# Patient Record
Sex: MALE | Race: AMERICAN INDIAN OR ALASKA NATIVE | NOT HISPANIC OR LATINO | Employment: FULL TIME | URBAN - METROPOLITAN AREA
[De-identification: names, ages, dates, MRNs, and addresses within clinical notes are randomized per-mention and may not be internally consistent; named-entity substitution may affect disease eponyms.]

---

## 2017-02-10 ENCOUNTER — APPOINTMENT (EMERGENCY)
Dept: RADIOLOGY | Facility: HOSPITAL | Age: 52
End: 2017-02-10
Payer: OTHER MISCELLANEOUS

## 2017-02-10 ENCOUNTER — HOSPITAL ENCOUNTER (EMERGENCY)
Facility: HOSPITAL | Age: 52
Discharge: HOME | End: 2017-02-10
Attending: EMERGENCY MEDICINE | Admitting: EMERGENCY MEDICINE
Payer: OTHER MISCELLANEOUS

## 2017-02-10 VITALS
HEART RATE: 76 BPM | OXYGEN SATURATION: 96 % | SYSTOLIC BLOOD PRESSURE: 181 MMHG | RESPIRATION RATE: 18 BRPM | WEIGHT: 150 LBS | HEIGHT: 65 IN | BODY MASS INDEX: 24.99 KG/M2 | DIASTOLIC BLOOD PRESSURE: 87 MMHG | TEMPERATURE: 98.6 F

## 2017-02-10 DIAGNOSIS — L08.9 SKIN INFECTION: Primary | ICD-10-CM

## 2017-02-10 DIAGNOSIS — S46.912A STRAIN OF LEFT SHOULDER, INITIAL ENCOUNTER: ICD-10-CM

## 2017-02-10 PROCEDURE — 90715 TDAP VACCINE 7 YRS/> IM: CPT | Performed by: EMERGENCY MEDICINE

## 2017-02-10 PROCEDURE — 73030 X-RAY EXAM OF SHOULDER: CPT

## 2017-02-10 PROCEDURE — 90471 IMMUNIZATION ADMIN: CPT

## 2017-02-10 PROCEDURE — 99283 EMERGENCY DEPT VISIT LOW MDM: CPT

## 2017-02-10 RX ORDER — DOXYCYCLINE HYCLATE 100 MG/1
100 CAPSULE ORAL 2 TIMES DAILY
Qty: 20 CAPSULE | Refills: 0 | Status: SHIPPED | OUTPATIENT
Start: 2017-02-10 | End: 2017-02-20

## 2017-02-10 RX ORDER — NAPROXEN 375 MG/1
375 TABLET ORAL 2 TIMES DAILY WITH MEALS
Qty: 14 TABLET | Refills: 0 | Status: SHIPPED | OUTPATIENT
Start: 2017-02-10 | End: 2017-02-17

## 2017-02-10 RX ORDER — DOXYCYCLINE HYCLATE 100 MG/1
100 CAPSULE ORAL ONCE
Status: COMPLETED | OUTPATIENT
Start: 2017-02-10 | End: 2017-02-10

## 2017-02-10 RX ADMIN — TETANUS TOXOID, REDUCED DIPHTHERIA TOXOID AND ACELLULAR PERTUSSIS VACCINE, ADSORBED 0.5 ML: 5; 2.5; 8; 8; 2.5 SUSPENSION INTRAMUSCULAR at 16:47

## 2017-02-10 RX ADMIN — DOXYCYCLINE HYCLATE 100 MG: 100 CAPSULE, GELATIN COATED ORAL at 16:46

## 2022-03-22 ENCOUNTER — TELEPHONE (OUTPATIENT)
Dept: GASTROENTEROLOGY | Facility: CLINIC | Age: 57
End: 2022-03-22

## 2022-03-22 NOTE — TELEPHONE ENCOUNTER
Patient called left message for an appointment  Returned call, he wants to see who he saw years ago, told him we will look in old system and call him back

## 2022-03-23 NOTE — TELEPHONE ENCOUNTER
Called patient to schedule appointment   He was at work and will back tomorrow to schedule  He saw Toby Powell in 2018

## 2022-05-09 ENCOUNTER — OFFICE VISIT (OUTPATIENT)
Dept: GASTROENTEROLOGY | Facility: CLINIC | Age: 57
End: 2022-05-09
Payer: COMMERCIAL

## 2022-05-09 VITALS
SYSTOLIC BLOOD PRESSURE: 126 MMHG | BODY MASS INDEX: 29.32 KG/M2 | HEIGHT: 65 IN | HEART RATE: 83 BPM | WEIGHT: 176 LBS | OXYGEN SATURATION: 96 % | DIASTOLIC BLOOD PRESSURE: 60 MMHG

## 2022-05-09 DIAGNOSIS — R35.0 FREQUENT URINATION: ICD-10-CM

## 2022-05-09 DIAGNOSIS — K21.9 GASTROESOPHAGEAL REFLUX DISEASE WITHOUT ESOPHAGITIS: Primary | ICD-10-CM

## 2022-05-09 PROCEDURE — 99203 OFFICE O/P NEW LOW 30 MIN: CPT | Performed by: PHYSICIAN ASSISTANT

## 2022-05-09 RX ORDER — TRIAMCINOLONE ACETONIDE 1 MG/G
CREAM TOPICAL
COMMUNITY
Start: 2022-03-29

## 2022-05-09 RX ORDER — PANTOPRAZOLE SODIUM 40 MG/1
TABLET, DELAYED RELEASE ORAL
COMMUNITY
Start: 2022-02-06 | End: 2022-05-09 | Stop reason: SDUPTHER

## 2022-05-09 RX ORDER — AMLODIPINE BESYLATE 10 MG/1
TABLET ORAL
COMMUNITY
Start: 2022-02-28

## 2022-05-09 RX ORDER — DOXEPIN HYDROCHLORIDE 100 MG/1
200 CAPSULE ORAL EVERY EVENING
COMMUNITY
Start: 2022-04-29

## 2022-05-09 RX ORDER — TRAZODONE HYDROCHLORIDE 100 MG/1
TABLET ORAL
COMMUNITY
Start: 2022-04-29

## 2022-05-09 RX ORDER — PANTOPRAZOLE SODIUM 40 MG/1
40 TABLET, DELAYED RELEASE ORAL DAILY
Qty: 90 TABLET | Refills: 3 | Status: SHIPPED | OUTPATIENT
Start: 2022-05-09

## 2022-05-09 NOTE — PATIENT INSTRUCTIONS
GERD (Gastroesophageal Reflux Disease)   WHAT YOU NEED TO KNOW:   Gastroesophageal reflux disease (GERD) is reflux that happens more than 2 times a week for a few weeks  Reflux means acid and food in your stomach back up into your esophagus  GERD can cause other health problems over time if it is not treated  DISCHARGE INSTRUCTIONS:   Call your local emergency number (911 in the [address] [facility]) if:   · You have severe chest pain and sudden trouble breathing  Return to the emergency department if:   · You have trouble breathing after you vomit  · You have trouble swallowing, or pain with swallowing  · Your bowel movements are black, bloody, or tarry-looking  · Your vomit looks like coffee grounds or has blood in it  Call your doctor or gastroenterologist if:   · You feel full and cannot burp or vomit  · You vomit large amounts, or you vomit often  · You are losing weight without trying  · Your symptoms get worse or do not improve with treatment  · You have questions or concerns about your condition or care  Medicines:   · Medicines  are used to decrease stomach acid  Medicine may also be used to help your lower esophageal sphincter and stomach contract (tighten) more  · Take your medicine as directed  Contact your healthcare provider if you think your medicine is not helping or if you have side effects  Tell him of her if you are allergic to any medicine  Keep a list of the medicines, vitamins, and herbs you take  Include the amounts, and when and why you take them  Bring the list or the pill bottles to follow-up visits  Carry your medicine list with you in case of an emergency  Manage GERD:       · Do not have foods or drinks that may increase heartburn  These include chocolate, peppermint, fried or fatty foods, drinks that contain caffeine, or carbonated drinks (soda)  Other foods include spicy foods, onions, tomatoes, and tomato-based foods   Do not have foods or drinks that can irritate your esophagus, such as citrus fruits, juices, and alcohol  · Do not eat large meals  When you eat a lot of food at one time, your stomach needs more acid to digest it  Eat 6 small meals each day instead of 3 large ones, and eat slowly  Do not eat meals 2 to 3 hours before bedtime  · Elevate the head of your bed  Place 6-inch blocks under the head of your bed frame  You may also use more than one pillow under your head and shoulders while you sleep  · Maintain a healthy weight  If you are overweight, weight loss may help relieve symptoms of GERD  · Do not smoke  Smoking weakens the lower esophageal sphincter and increases the risk of GERD  Ask your healthcare provider for information if you currently smoke and need help to quit  E-cigarettes or smokeless tobacco still contain nicotine  Talk to your healthcare provider before you use these products  · Do not put pressure on your abdomen  Pressure pushes acid up into your esophagus  Do not wear clothing that is tight around your waist  Do not bend over  Bend at the knees if you need to pick something up  Follow up with your doctor or gastroenterologist as directed:  Write down your questions so you remember to ask them during your visits  © Copyright [employer] 2022 Information is for End User's use only and may not be sold, redistributed or otherwise used for commercial purposes  All illustrations and images included in CareNotes® are the copyrighted property of [employer]  or [employer]kechi [employer]  The above information is an  only  It is not intended as medical advice for individual conditions or treatments  Talk to your doctor, nurse or pharmacist before following any medical regimen to see if it is safe and effective for you

## 2022-05-09 NOTE — PROGRESS NOTES
[facility] - Outpatient Consultation  Angel Jha 61 y o  male MRN: [id number]  Encounter: [id number]          ASSESSMENT AND PLAN:      1  Gastroesophageal reflux disease without esophagitis  -Will refill pantoprazole 40 mg daily  2  Frequent urination  -Will check urinalysis  ______________________________________________________________________    HPI:    18-year-old male with a past medical history significant for hypertension, gastroesophageal reflux disease, diabetes mellitus  We have not seen patient in the office in quite some time  In fact the last time we had seen patient was at her old practice  Patient suffers from acid reflux disease and maintains himself on pantoprazole 40 mg daily  He does need a refill this medication  He reports occasional breakthrough symptoms were he has to utilize Tums  He denies any melena or rectal bleeding  Denies any diarrhea constipation  Denies any dysphagia  His weight is stable  He is reporting nocturnal frequent urination  He does report for the past 2 days he has had some burning with urination  REVIEW OF SYSTEMS:    CONSTITUTIONAL: Denies any fever, chills, rigors, and weight loss  HEENT: No earache or tinnitus  Denies hearing loss or visual disturbances  CARDIOVASCULAR: No chest pain or palpitations  RESPIRATORY: Denies any cough, hemoptysis, shortness of breath or dyspnea on exertion  GASTROINTESTINAL: As noted in the History of Present Illness  GENITOURINARY: No problems with urination  Denies any hematuria or dysuria  NEUROLOGIC: No dizziness or vertigo, denies headaches  MUSCULOSKELETAL: Denies any muscle or joint pain  SKIN: Denies skin rashes or itching  ENDOCRINE: Denies excessive thirst  Denies intolerance to heat or cold  PSYCHOSOCIAL: Denies depression or anxiety  Denies any recent memory loss         Historical Information   Past Medical History:   Diagnosis Date    Hypertension      History reviewed  No pertinent surgical history  Social History   Social History     Substance and Sexual Activity   Alcohol Use No     Social History     Substance and Sexual Activity   Drug Use No     Social History     Tobacco Use   Smoking Status Current Every Day Smoker   Smokeless Tobacco Never Used     History reviewed  No pertinent family history  Meds/Allergies       Current Outpatient Medications:     amLODIPine (NORVASC) 10 mg tablet    doxepin (SINEquan) 100 mg capsule    metFORMIN (GLUCOPHAGE) 500 mg tablet    pantoprazole (PROTONIX) 40 mg tablet    traZODone (DESYREL) 100 mg tablet    triamcinolone (KENALOG) 0 1 % cream    naproxen (NAPROSYN) 375 mg tablet    No Known Allergies        Objective     Blood pressure 126/60, pulse 83, height 5' 5" (1 651 m), weight 79 8 kg (176 lb), SpO2 96 %  Body mass index is 29 29 kg/m²  PHYSICAL EXAM:      General Appearance:   Alert, cooperative, no distress   HEENT:   Normocephalic, atraumatic, anicteric      Neck:  Supple, symmetrical, trachea midline   Lungs:   Clear to auscultation bilaterally; no rales, rhonchi or wheezing; respirations unlabored    Heart[de-identified]   Regular rate and rhythm; no murmur, rub, or gallop  Abdomen:   Soft, non-tender, non-distended; normal bowel sounds; no masses, no organomegaly    Genitalia:   Deferred    Rectal:   Deferred    Extremities:  No cyanosis, clubbing or edema    Pulses:  2+ and symmetric    Skin:  No jaundice, rashes, or lesions    Lymph nodes:  No palpable cervical lymphadenopathy        Lab Results:   No visits with results within 1 Day(s) from this visit  Latest known visit with results is:   No results found for any previous visit  Radiology Results:   No results found

## 2022-05-09 NOTE — LETTER
May 9, 2022     Ty Hickman MD  [facility] [id number]    Patient: Cathryn Barboza   YOB: 1965   Date of Visit: 5/9/2022       Dear Dr Rachel Nowak:    Thank you for referring Luis Vital to me for evaluation  Below are my notes for this consultation  If you have questions, please do not hesitate to call me  I look forward to following your patient along with you  Sincerely,        Shanita Soriano PA-C        CC: No Recipients  Tarun Bunn  5/9/2022  9:24 AM  Sign when Signing Visit  [facility] - Outpatient Consultation  Cathryn Barboza 61 y o  male MRN: [id number]  Encounter: [id number]          ASSESSMENT AND PLAN:      1  Gastroesophageal reflux disease without esophagitis  -Will refill pantoprazole 40 mg daily  2  Frequent urination  -Will check urinalysis  ______________________________________________________________________    HPI:    59-year-old male with a past medical history significant for hypertension, gastroesophageal reflux disease, diabetes mellitus  We have not seen patient in the office in quite some time  In fact the last time we had seen patient was at her old practice  Patient suffers from acid reflux disease and maintains himself on pantoprazole 40 mg daily  He does need a refill this medication  He reports occasional breakthrough symptoms were he has to utilize Tums  He denies any melena or rectal bleeding  Denies any diarrhea constipation  Denies any dysphagia  His weight is stable  He is reporting nocturnal frequent urination  He does report for the past 2 days he has had some burning with urination  REVIEW OF SYSTEMS:    CONSTITUTIONAL: Denies any fever, chills, rigors, and weight loss  HEENT: No earache or tinnitus  Denies hearing loss or visual disturbances  CARDIOVASCULAR: No chest pain or palpitations     RESPIRATORY: Denies any cough, hemoptysis, shortness of breath or dyspnea on exertion  GASTROINTESTINAL: As noted in the History of Present Illness  GENITOURINARY: No problems with urination  Denies any hematuria or dysuria  NEUROLOGIC: No dizziness or vertigo, denies headaches  MUSCULOSKELETAL: Denies any muscle or joint pain  SKIN: Denies skin rashes or itching  ENDOCRINE: Denies excessive thirst  Denies intolerance to heat or cold  PSYCHOSOCIAL: Denies depression or anxiety  Denies any recent memory loss  Historical Information   Past Medical History:   Diagnosis Date    Hypertension      History reviewed  No pertinent surgical history  Social History   Social History     Substance and Sexual Activity   Alcohol Use No     Social History     Substance and Sexual Activity   Drug Use No     Social History     Tobacco Use   Smoking Status Current Every Day Smoker   Smokeless Tobacco Never Used     History reviewed  No pertinent family history  Meds/Allergies       Current Outpatient Medications:     amLODIPine (NORVASC) 10 mg tablet    doxepin (SINEquan) 100 mg capsule    metFORMIN (GLUCOPHAGE) 500 mg tablet    pantoprazole (PROTONIX) 40 mg tablet    traZODone (DESYREL) 100 mg tablet    triamcinolone (KENALOG) 0 1 % cream    naproxen (NAPROSYN) 375 mg tablet    No Known Allergies        Objective     Blood pressure 126/60, pulse 83, height 5' 5" (1 651 m), weight 79 8 kg (176 lb), SpO2 96 %  Body mass index is 29 29 kg/m²  PHYSICAL EXAM:      General Appearance:   Alert, cooperative, no distress   HEENT:   Normocephalic, atraumatic, anicteric      Neck:  Supple, symmetrical, trachea midline   Lungs:   Clear to auscultation bilaterally; no rales, rhonchi or wheezing; respirations unlabored    Heart[de-identified]   Regular rate and rhythm; no murmur, rub, or gallop     Abdomen:   Soft, non-tender, non-distended; normal bowel sounds; no masses, no organomegaly    Genitalia:   Deferred    Rectal:   Deferred    Extremities:  No cyanosis, clubbing or edema    Pulses:  2+ and symmetric    Skin:  No jaundice, rashes, or lesions    Lymph nodes:  No palpable cervical lymphadenopathy        Lab Results:   No visits with results within 1 Day(s) from this visit  Latest known visit with results is:   No results found for any previous visit  Radiology Results:   No results found

## 2022-06-21 ENCOUNTER — TELEPHONE (OUTPATIENT)
Dept: GASTROENTEROLOGY | Facility: CLINIC | Age: 57
End: 2022-06-21

## 2023-06-05 DIAGNOSIS — K21.9 GASTROESOPHAGEAL REFLUX DISEASE WITHOUT ESOPHAGITIS: ICD-10-CM

## 2023-06-06 RX ORDER — PANTOPRAZOLE SODIUM 40 MG/1
40 TABLET, DELAYED RELEASE ORAL DAILY
Qty: 90 TABLET | Refills: 0 | Status: SHIPPED | OUTPATIENT
Start: 2023-06-06

## 2023-06-29 ENCOUNTER — TELEPHONE (OUTPATIENT)
Dept: GASTROENTEROLOGY | Facility: CLINIC | Age: 58
End: 2023-06-29

## 2023-06-29 NOTE — TELEPHONE ENCOUNTER
Pt stopped by the office  Pt reports he is not having any serious issues but would like to move up his appt sooner  He is scheduled to see Gwendolyn Traylor on 7/26/23  Shiva Goins placed him on cancellation wait list   Pt states he work nights and would prefer an early appt  I offered pt to see another provider due to this work schedule and insisted he stay with [facility]  He is taking Protonix 40 mg and and the medication is working well for him

## 2023-07-21 RX ORDER — AMLODIPINE BESYLATE 10 MG/1
10 TABLET ORAL DAILY
COMMUNITY
Start: 2022-04-06

## 2023-07-21 RX ORDER — ASPIRIN 81 MG/1
81 TABLET ORAL DAILY
COMMUNITY

## 2023-07-21 RX ORDER — TRAZODONE HYDROCHLORIDE 100 MG/1
TABLET ORAL
COMMUNITY
Start: 2022-04-04

## 2023-07-26 ENCOUNTER — OFFICE VISIT (OUTPATIENT)
Dept: GASTROENTEROLOGY | Facility: CLINIC | Age: 58
End: 2023-07-26
Payer: COMMERCIAL

## 2023-07-26 VITALS
WEIGHT: 176.6 LBS | SYSTOLIC BLOOD PRESSURE: 142 MMHG | DIASTOLIC BLOOD PRESSURE: 76 MMHG | BODY MASS INDEX: 29.42 KG/M2 | OXYGEN SATURATION: 97 % | HEIGHT: 65 IN | HEART RATE: 83 BPM

## 2023-07-26 DIAGNOSIS — Z12.11 SCREENING FOR MALIGNANT NEOPLASM OF COLON: Primary | ICD-10-CM

## 2023-07-26 DIAGNOSIS — K21.9 GASTROESOPHAGEAL REFLUX DISEASE WITHOUT ESOPHAGITIS: ICD-10-CM

## 2023-07-26 PROCEDURE — 99213 OFFICE O/P EST LOW 20 MIN: CPT | Performed by: PHYSICIAN ASSISTANT

## 2023-07-26 RX ORDER — PANTOPRAZOLE SODIUM 40 MG/1
40 TABLET, DELAYED RELEASE ORAL DAILY
Qty: 90 TABLET | Refills: 3 | Status: SHIPPED | OUTPATIENT
Start: 2023-07-26

## 2023-07-26 NOTE — PROGRESS NOTES
[facility] - Outpatient Follow-up Note  Giovanny Cadena 60 y.o. male MRN: [id number]  Encounter: [id number]          ASSESSMENT AND PLAN:      1. Screening for malignant neoplasm of colon  2. Gastroesophageal reflux disease without esophagitis  -Patient will be scheduled for an EGD and a colonoscopy at this time.  -Pantoprazole 40 mg daily has been refilled to the pharmacy.  -Continue GERD dietary modifications.    -Follow-up after GI examinations. ______________________________________________________________________    SUBJECTIVE:    69-year-old male with past medical history significant for GERD and hypertension presents the GI clinic today for follow-up. Patient reports that he is here for medication refill in regards to his acid reflux. Patient is also due for routine screening colonoscopy. Patient's last colonoscopy was 10 years ago. Patient's last upper endoscopy was several years ago and he did have evidence of esophagitis. Patient is still reporting GERD symptoms if he does not take his pantoprazole 40 mg daily. Patient denies any melena or rectal bleeding. Patient denies any diarrhea or constipation. Patient denies any abdominal pain. Patient denies any dysphagia. REVIEW OF SYSTEMS IS OTHERWISE NEGATIVE. Historical Information   Past Medical History:   Diagnosis Date   • Hypertension      History reviewed. No pertinent surgical history. Social History   Social History     Substance and Sexual Activity   Alcohol Use No     Social History     Substance and Sexual Activity   Drug Use No     Social History     Tobacco Use   Smoking Status Every Day   Smokeless Tobacco Never     History reviewed. No pertinent family history.     Meds/Allergies       Current Outpatient Medications:   •  amLODIPine (NORVASC) 10 mg tablet  •  amLODIPine (NORVASC) 10 mg tablet  •  aspirin (ECOTRIN LOW STRENGTH) 81 mg EC tablet  •  doxepin (SINEquan) 100 mg capsule  •  metFORMIN (GLUCOPHAGE) 500 mg tablet  •  metFORMIN (GLUCOPHAGE) 500 mg tablet  •  pantoprazole (PROTONIX) 40 mg tablet  •  traZODone (DESYREL) 100 mg tablet  •  traZODone (DESYREL) 100 mg tablet  •  triamcinolone (KENALOG) 0.1 % cream  •  naproxen (NAPROSYN) 375 mg tablet    No Known Allergies        Objective     Blood pressure 142/76, pulse 83, height 5' 5" (1.651 m), weight 80.1 kg (176 lb 9.6 oz), SpO2 97 %. Body mass index is 29.39 kg/m². PHYSICAL EXAM:      General Appearance:   Alert, cooperative, no distress   HEENT:   Normocephalic, atraumatic, anicteric.     Neck:  Supple, symmetrical, trachea midline   Lungs:   Clear to auscultation bilaterally; no rales, rhonchi or wheezing; respirations unlabored    Heart[de-identified]   Regular rate and rhythm; no murmur, rub, or gallop. Abdomen:   Soft, non-tender, non-distended; normal bowel sounds; no masses, no organomegaly    Genitalia:   Deferred    Rectal:   Deferred    Extremities:  No cyanosis, clubbing or edema    Pulses:  2+ and symmetric    Skin:  No jaundice, rashes, or lesions    Lymph nodes:  No palpable cervical lymphadenopathy        Lab Results:   No visits with results within 1 Day(s) from this visit. Latest known visit with results is:   No results found for any previous visit. Radiology Results:   No results found.

## 2023-09-28 ENCOUNTER — TELEPHONE (OUTPATIENT)
Dept: GASTROENTEROLOGY | Facility: CLINIC | Age: 58
End: 2023-09-28

## 2023-11-06 ENCOUNTER — TELEPHONE (OUTPATIENT)
Age: 58
End: 2023-11-06

## 2023-11-06 NOTE — TELEPHONE ENCOUNTER
Patient stopped by today. He only wants to speak to you. Pt states he is ready to schedule his EGD/Colon but want to speak to you first before schedule it.

## 2023-11-06 NOTE — TELEPHONE ENCOUNTER
Patients GI provider: Anthony BOLES    Number to return call: ( [phone]    Reason for call: Pt calling asking for a call back from office. Pt was offered help by me and he only wants to speak to in office staff. Please advise. Tried to call office 3 times    Scheduled procedure/appointment date if applicable: Apt/procedure

## 2023-11-09 NOTE — TELEPHONE ENCOUNTER
Called patient and scheduled procedures - reviewed and mailed prepping Instructions     Scheduled date of EGD/colonoscopy (as of today):12/14/23  Physician performing EGD/colonoscopy:Manjit  Location of EGD/colonoscopy:[facility] bowel prep reviewed with patient:Yuliya/[facility]  Instructions reviewed with patient by:Bhupinder guadarrama  Clearances:  none

## 2023-12-07 ENCOUNTER — TELEPHONE (OUTPATIENT)
Dept: GASTROENTEROLOGY | Facility: CLINIC | Age: 58
End: 2023-12-07

## 2023-12-14 ENCOUNTER — ANESTHESIA EVENT (OUTPATIENT)
Dept: GASTROENTEROLOGY | Facility: HOSPITAL | Age: 58
End: 2023-12-14

## 2023-12-14 ENCOUNTER — ANESTHESIA (OUTPATIENT)
Dept: GASTROENTEROLOGY | Facility: HOSPITAL | Age: 58
End: 2023-12-14

## 2023-12-14 ENCOUNTER — HOSPITAL ENCOUNTER (OUTPATIENT)
Dept: GASTROENTEROLOGY | Facility: HOSPITAL | Age: 58
Setting detail: OUTPATIENT SURGERY
End: 2023-12-14
Payer: COMMERCIAL

## 2023-12-14 VITALS
HEART RATE: 75 BPM | RESPIRATION RATE: 19 BRPM | DIASTOLIC BLOOD PRESSURE: 56 MMHG | SYSTOLIC BLOOD PRESSURE: 101 MMHG | OXYGEN SATURATION: 96 % | TEMPERATURE: 97.3 F | BODY MASS INDEX: 26.18 KG/M2 | HEIGHT: 66 IN | WEIGHT: 162.92 LBS

## 2023-12-14 DIAGNOSIS — Z12.11 SCREENING FOR MALIGNANT NEOPLASM OF COLON: ICD-10-CM

## 2023-12-14 DIAGNOSIS — K21.9 GASTROESOPHAGEAL REFLUX DISEASE WITHOUT ESOPHAGITIS: ICD-10-CM

## 2023-12-14 PROBLEM — E78.2 MIXED HYPERLIPIDEMIA: Status: ACTIVE | Noted: 2018-10-31

## 2023-12-14 PROBLEM — I10 HYPERTENSION: Status: ACTIVE | Noted: 2018-09-27

## 2023-12-14 PROBLEM — K22.70 BARRETT'S ESOPHAGUS WITHOUT DYSPLASIA: Status: ACTIVE | Noted: 2023-09-27

## 2023-12-14 PROBLEM — F17.200 TOBACCO USE DISORDER: Status: ACTIVE | Noted: 2018-09-27

## 2023-12-14 PROBLEM — R77.8 ABNORMAL SPEP: Status: ACTIVE | Noted: 2023-10-12

## 2023-12-14 PROBLEM — E11.9 TYPE 2 DIABETES MELLITUS WITHOUT COMPLICATION, WITHOUT LONG-TERM CURRENT USE OF INSULIN (HCC): Status: ACTIVE | Noted: 2020-12-22

## 2023-12-14 LAB — GLUCOSE SERPL-MCNC: 181 MG/DL (ref 65–140)

## 2023-12-14 PROCEDURE — 88305 TISSUE EXAM BY PATHOLOGIST: CPT | Performed by: PATHOLOGY

## 2023-12-14 PROCEDURE — G0121 COLON CA SCRN NOT HI RSK IND: HCPCS | Performed by: INTERNAL MEDICINE

## 2023-12-14 PROCEDURE — 43239 EGD BIOPSY SINGLE/MULTIPLE: CPT | Performed by: INTERNAL MEDICINE

## 2023-12-14 PROCEDURE — 82948 REAGENT STRIP/BLOOD GLUCOSE: CPT

## 2023-12-14 PROCEDURE — 88341 IMHCHEM/IMCYTCHM EA ADD ANTB: CPT | Performed by: PATHOLOGY

## 2023-12-14 PROCEDURE — 88342 IMHCHEM/IMCYTCHM 1ST ANTB: CPT | Performed by: PATHOLOGY

## 2023-12-14 RX ORDER — PHENYLEPHRINE HCL IN 0.9% NACL 1 MG/10 ML
SYRINGE (ML) INTRAVENOUS AS NEEDED
Status: DISCONTINUED | OUTPATIENT
Start: 2023-12-14 | End: 2023-12-14

## 2023-12-14 RX ORDER — LIDOCAINE HYDROCHLORIDE 20 MG/ML
INJECTION, SOLUTION EPIDURAL; INFILTRATION; INTRACAUDAL; PERINEURAL AS NEEDED
Status: DISCONTINUED | OUTPATIENT
Start: 2023-12-14 | End: 2023-12-14

## 2023-12-14 RX ORDER — PROPOFOL 10 MG/ML
INJECTION, EMULSION INTRAVENOUS AS NEEDED
Status: DISCONTINUED | OUTPATIENT
Start: 2023-12-14 | End: 2023-12-14

## 2023-12-14 RX ORDER — SODIUM CHLORIDE, SODIUM LACTATE, POTASSIUM CHLORIDE, CALCIUM CHLORIDE 600; 310; 30; 20 MG/100ML; MG/100ML; MG/100ML; MG/100ML
75 INJECTION, SOLUTION INTRAVENOUS CONTINUOUS
Status: DISCONTINUED | OUTPATIENT
Start: 2023-12-14 | End: 2023-12-18 | Stop reason: HOSPADM

## 2023-12-14 RX ADMIN — LIDOCAINE HYDROCHLORIDE 100 MG: 20 INJECTION, SOLUTION EPIDURAL; INFILTRATION; INTRACAUDAL; PERINEURAL at 07:41

## 2023-12-14 RX ADMIN — PROPOFOL 150 MG: 10 INJECTION, EMULSION INTRAVENOUS at 07:41

## 2023-12-14 RX ADMIN — PROPOFOL 20 MG: 10 INJECTION, EMULSION INTRAVENOUS at 07:56

## 2023-12-14 RX ADMIN — PROPOFOL 20 MG: 10 INJECTION, EMULSION INTRAVENOUS at 08:02

## 2023-12-14 RX ADMIN — PROPOFOL 20 MG: 10 INJECTION, EMULSION INTRAVENOUS at 08:00

## 2023-12-14 RX ADMIN — PROPOFOL 50 MG: 10 INJECTION, EMULSION INTRAVENOUS at 07:43

## 2023-12-14 RX ADMIN — SODIUM CHLORIDE, SODIUM LACTATE, POTASSIUM CHLORIDE, AND CALCIUM CHLORIDE: .6; .31; .03; .02 INJECTION, SOLUTION INTRAVENOUS at 07:35

## 2023-12-14 RX ADMIN — PROPOFOL 20 MG: 10 INJECTION, EMULSION INTRAVENOUS at 07:58

## 2023-12-14 RX ADMIN — PROPOFOL 30 MG: 10 INJECTION, EMULSION INTRAVENOUS at 07:48

## 2023-12-14 RX ADMIN — Medication 100 MCG: at 07:54

## 2023-12-14 RX ADMIN — PROPOFOL 20 MG: 10 INJECTION, EMULSION INTRAVENOUS at 07:50

## 2023-12-14 RX ADMIN — PROPOFOL 20 MG: 10 INJECTION, EMULSION INTRAVENOUS at 07:54

## 2023-12-14 RX ADMIN — PROPOFOL 20 MG: 10 INJECTION, EMULSION INTRAVENOUS at 07:52

## 2023-12-14 RX ADMIN — PROPOFOL 30 MG: 10 INJECTION, EMULSION INTRAVENOUS at 07:46

## 2023-12-14 NOTE — ANESTHESIA PREPROCEDURE EVALUATION
Procedure:  COLONOSCOPY  EGD    Relevant Problems   CARDIO   (+) Hypertension   (+) Mixed hyperlipidemia      ENDO   (+) Type 2 diabetes mellitus without complication, without long-term current use of insulin (HCC)      Digestive   (+) Cadet's esophagus without dysplasia      Other   (+) Abnormal SPEP   (+) Tobacco use disorder        Physical Exam    Airway    Mallampati score: II  TM Distance: >3 FB  Neck ROM: full     Dental   No notable dental hx     Cardiovascular  Cardiovascular exam normal    Pulmonary  Pulmonary exam normal     Other Findings        Anesthesia Plan  ASA Score- 3     Anesthesia Type- IV sedation with anesthesia with ASA Monitors. Additional Monitors:     Airway Plan:            Plan Factors-Exercise tolerance (METS): >4 METS. Chart reviewed. EKG reviewed. Imaging results reviewed. Existing labs reviewed. Patient summary reviewed. Patient is a current smoker. Patient instructed to abstain from smoking on day of procedure. Patient smoked on day of surgery. Induction- intravenous. Postoperative Plan-     Informed Consent- Anesthetic plan and risks discussed with patient. I personally reviewed this patient with the CRNA. Discussed and agreed on the Anesthesia Plan with the CRNA. Missy Arcos

## 2023-12-14 NOTE — ANESTHESIA POSTPROCEDURE EVALUATION
Post-Op Assessment Note    CV Status:  Stable  Pain Score: 0    Pain management: adequate       Mental Status:  Alert and awake   Hydration Status:  Euvolemic   PONV Controlled:  Controlled   Airway Patency:  Patent     Post Op Vitals Reviewed: Yes      Staff: CRNA               BP 92/54 (12/14/23 0808)    Temp (!) 97.3 °F (36.3 °C) (12/14/23 0808)    Pulse 82 (12/14/23 0808)   Resp 19 (12/14/23 0808)    SpO2 95 % (12/14/23 0808)

## 2023-12-14 NOTE — H&P
History and Physical - [facility]  Luis Vital 60 y.o. male MRN: [id number]      HPI: Jayne Peng is a 60y.o. year old male who presents for history of gastroesophageal reflux disease and he presents for a screening colonoscopy      REVIEW OF SYSTEMS: Per the HPI, and otherwise unremarkable. Historical Information   Past Medical History:   Diagnosis Date    Hypertension      No past surgical history on file. Social History   Social History     Substance and Sexual Activity   Alcohol Use No     Social History     Substance and Sexual Activity   Drug Use No     Social History     Tobacco Use   Smoking Status Every Day   Smokeless Tobacco Never     No family history on file. Meds/Allergies     (Not in a hospital admission)      No Known Allergies    Objective     Blood pressure 133/72, pulse 92, temperature 97.9 °F (36.6 °C), temperature source Temporal, resp. rate 16, height 5' 6" (1.676 m), weight 73.9 kg (162 lb 14.7 oz), SpO2 97%. PHYSICAL EXAM    Gen: NAD  CV: RRR  CHEST: Clear  ABD: soft, NT/ND  EXT: no edema      ASSESSMENT/PLAN:  This is a 60y.o. year old male here for EGD, colonoscopy, and he is stable and optimized for his procedure.

## 2023-12-20 PROCEDURE — 88342 IMHCHEM/IMCYTCHM 1ST ANTB: CPT | Performed by: PATHOLOGY

## 2023-12-20 PROCEDURE — 88305 TISSUE EXAM BY PATHOLOGIST: CPT | Performed by: PATHOLOGY

## 2023-12-20 PROCEDURE — 88341 IMHCHEM/IMCYTCHM EA ADD ANTB: CPT | Performed by: PATHOLOGY

## 2023-12-22 ENCOUNTER — PREP FOR PROCEDURE (OUTPATIENT)
Dept: GASTROENTEROLOGY | Facility: MEDICAL CENTER | Age: 58
End: 2023-12-22

## 2023-12-22 DIAGNOSIS — K22.711 BARRETT'S ESOPHAGUS WITH HIGH GRADE DYSPLASIA: Primary | ICD-10-CM

## 2023-12-22 NOTE — H&P
Contacted by Dr. Saravia regarding patient having had an EGD and finding of area of nodularity at the GE junction with HGD.  Plan for repeat EGD with EMR.   We will schedule.

## 2024-01-03 ENCOUNTER — TELEPHONE (OUTPATIENT)
Dept: GASTROENTEROLOGY | Facility: CLINIC | Age: 59
End: 2024-01-03

## 2024-01-03 NOTE — TELEPHONE ENCOUNTER
----- Message from Lucretia Treviño sent at 12/28/2023  9:29 AM EST -----    ----- Message -----  From: Mathew Aviles MD  Sent: 12/22/2023   9:10 AM EST  To: [facility]    Please schedule p[atietn for an EGD with EMR wihtin 2 - 3 weeks.   Thank you.   Mathew

## 2024-01-11 NOTE — TELEPHONE ENCOUNTER
Scheduled date of EGD with EMR  (as of today):2/1/24  Physician performing EGD: Dr. Aviles  Location of EGD: [facility]   Instructions reviewed with patient by: Lucretia - mailed   Clearances: Diabetic-med form sent

## 2024-02-01 ENCOUNTER — ANESTHESIA (OUTPATIENT)
Dept: GASTROENTEROLOGY | Facility: HOSPITAL | Age: 59
End: 2024-02-01

## 2024-02-01 ENCOUNTER — ANESTHESIA EVENT (OUTPATIENT)
Dept: GASTROENTEROLOGY | Facility: HOSPITAL | Age: 59
End: 2024-02-01

## 2024-02-01 ENCOUNTER — HOSPITAL ENCOUNTER (OUTPATIENT)
Dept: GASTROENTEROLOGY | Facility: HOSPITAL | Age: 59
Setting detail: OUTPATIENT SURGERY
End: 2024-02-01
Attending: INTERNAL MEDICINE
Payer: COMMERCIAL

## 2024-02-01 VITALS
OXYGEN SATURATION: 96 % | HEIGHT: 66 IN | RESPIRATION RATE: 16 BRPM | DIASTOLIC BLOOD PRESSURE: 67 MMHG | BODY MASS INDEX: 26.03 KG/M2 | TEMPERATURE: 96.6 F | WEIGHT: 162 LBS | HEART RATE: 66 BPM | SYSTOLIC BLOOD PRESSURE: 117 MMHG

## 2024-02-01 DIAGNOSIS — K22.711 BARRETT'S ESOPHAGUS WITH HIGH GRADE DYSPLASIA: ICD-10-CM

## 2024-02-01 PROCEDURE — 43259 EGD US EXAM DUODENUM/JEJUNUM: CPT | Performed by: INTERNAL MEDICINE

## 2024-02-01 RX ORDER — LIDOCAINE HYDROCHLORIDE 20 MG/ML
INJECTION, SOLUTION EPIDURAL; INFILTRATION; INTRACAUDAL; PERINEURAL AS NEEDED
Status: DISCONTINUED | OUTPATIENT
Start: 2024-02-01 | End: 2024-02-01

## 2024-02-01 RX ORDER — PROPOFOL 10 MG/ML
INJECTION, EMULSION INTRAVENOUS AS NEEDED
Status: DISCONTINUED | OUTPATIENT
Start: 2024-02-01 | End: 2024-02-01

## 2024-02-01 RX ORDER — FENTANYL CITRATE 50 UG/ML
INJECTION, SOLUTION INTRAMUSCULAR; INTRAVENOUS AS NEEDED
Status: DISCONTINUED | OUTPATIENT
Start: 2024-02-01 | End: 2024-02-01

## 2024-02-01 RX ORDER — MIDAZOLAM HYDROCHLORIDE 2 MG/2ML
INJECTION, SOLUTION INTRAMUSCULAR; INTRAVENOUS AS NEEDED
Status: DISCONTINUED | OUTPATIENT
Start: 2024-02-01 | End: 2024-02-01

## 2024-02-01 RX ORDER — SODIUM CHLORIDE 9 MG/ML
INJECTION, SOLUTION INTRAVENOUS CONTINUOUS PRN
Status: DISCONTINUED | OUTPATIENT
Start: 2024-02-01 | End: 2024-02-01

## 2024-02-01 RX ADMIN — SODIUM CHLORIDE: 0.9 INJECTION, SOLUTION INTRAVENOUS at 07:42

## 2024-02-01 RX ADMIN — FENTANYL CITRATE 25 MCG: 50 INJECTION INTRAMUSCULAR; INTRAVENOUS at 07:45

## 2024-02-01 RX ADMIN — PROPOFOL 100 MCG/KG/MIN: 10 INJECTION, EMULSION INTRAVENOUS at 07:54

## 2024-02-01 RX ADMIN — TOPICAL ANESTHETIC 2 SPRAY: 200 SPRAY DENTAL; PERIODONTAL at 07:43

## 2024-02-01 RX ADMIN — LIDOCAINE HYDROCHLORIDE 100 MG: 20 INJECTION, SOLUTION EPIDURAL; INFILTRATION; INTRACAUDAL; PERINEURAL at 07:46

## 2024-02-01 RX ADMIN — PROPOFOL 120 MCG/KG/MIN: 10 INJECTION, EMULSION INTRAVENOUS at 07:46

## 2024-02-01 RX ADMIN — MIDAZOLAM 1 MG: 1 INJECTION INTRAMUSCULAR; INTRAVENOUS at 07:43

## 2024-02-01 RX ADMIN — FENTANYL CITRATE 25 MCG: 50 INJECTION INTRAMUSCULAR; INTRAVENOUS at 07:50

## 2024-02-01 NOTE — H&P
History and Physical -  [facility]  Luis Vital 58 y.o. male MRN: [id number]    HPI: Luis Vital is a 58 y.o. year old male who presents with esophageal lesion.       Review of Systems    Historical Information   Past Medical History:   Diagnosis Date    Hypertension      History reviewed. No pertinent surgical history.  Social History   Social History     Substance and Sexual Activity   Alcohol Use No     Social History     Substance and Sexual Activity   Drug Use No     Social History     Tobacco Use   Smoking Status Every Day    Current packs/day: 0.50    Average packs/day: 0.5 packs/day for 28.1 years (14.0 ttl pk-yrs)    Types: Cigarettes    Start date: 1/1/1996   Smokeless Tobacco Never     History reviewed. No pertinent family history.    Meds/Allergies     (Not in a hospital admission)      No Known Allergies    Objective     There were no vitals taken for this visit.      PHYSICAL EXAM    Gen: NAD  CV: RRR  CHEST: Clear  ABD: soft, NT/ND  EXT: no edema  Neuro: AAO      ASSESSMENT/PLAN:  This is a 58 y.o. year old male here for esophageal lesion/ EMR    PLAN:   Procedure: EGD/ EMR

## 2024-02-01 NOTE — ANESTHESIA PREPROCEDURE EVALUATION
Procedure:  EGD  Cadet's esophagus with high grade dysplasia         Relevant Problems   CARDIO   (+) Hypertension   (+) Mixed hyperlipidemia      ENDO   (+) Type 2 diabetes mellitus without complication, without long-term current use of insulin (HCC)      Abnormal SPEP   Cadet's esophagus without dysplasia   Hypertension   Mixed hyperlipidemia   Tobacco use disorder   Type 2 diabetes mellitus without complication, without long-term current use of insulin (HCC)     Physical Exam    Airway    Mallampati score: III  TM Distance: >3 FB  Neck ROM: full     Dental       Cardiovascular  Cardiovascular exam normal    Pulmonary  Pulmonary exam normal     Other Findings        Anesthesia Plan  ASA Score- 3     Anesthesia Type- IV sedation with anesthesia with ASA Monitors.         Additional Monitors:     Airway Plan:            Plan Factors-Exercise tolerance (METS): >4 METS.    Chart reviewed. EKG reviewed.  Existing labs reviewed. Patient summary reviewed.    Patient is a current smoker.  Patient instructed to abstain from smoking on day of procedure.             Induction- intravenous.    Postoperative Plan-     Informed Consent- Anesthetic plan and risks discussed with patient.  I personally reviewed this patient with the CRNA. Discussed and agreed on the Anesthesia Plan with the CRNA..          Substance History   Current as of 02/01/24 1276  Smoking Status: Every Day - 14 pack years   Smokeless Tobacco Status: Never   Alcohol use: No   Drug use: No

## 2024-02-01 NOTE — ANESTHESIA POSTPROCEDURE EVALUATION
Post-Op Assessment Note    CV Status:  Stable  Pain Score: 0    Pain management: adequate       Mental Status:  Sleepy   Hydration Status:  Stable   PONV Controlled:  Controlled   Airway Patency:  Patent  Two or more mitigation strategies used for obstructive sleep apnea   Post Op Vitals Reviewed: Yes    No anethesia notable event occurred.    Staff: Anesthesiologist, CRNA               BP 94/52 (02/01/24 0819)    Temp   98   Pulse 60 (02/01/24 0819)   Resp 15 (02/01/24 0819)    SpO2 97 % (02/01/24 0819)

## 2024-03-20 ENCOUNTER — TELEPHONE (OUTPATIENT)
Age: 59
End: 2024-03-20

## 2024-03-20 NOTE — TELEPHONE ENCOUNTER
Patients GI provider:  Dr. Aviles    Number to return call: [phone]     Reason for call: Pt's calling in regards to referral to doc in [place] that Dr. Aviles recommended for lesion. Did not see anything in doc's notes regarding this. Please review and call wife back to advise.    Scheduled procedure/appointment date if applicable: N/A

## 2024-03-22 DIAGNOSIS — K22.711 BARRETT'S ESOPHAGUS WITH HIGH GRADE DYSPLASIA: Primary | ICD-10-CM

## 2024-03-25 NOTE — TELEPHONE ENCOUNTER
Called spoke to Paz from 's office they have no available dates for new pts  and  other providers is not available.He will be put on waiting list unless  is able to expedite the appt for severity of symptoms ,  Pleases advise

## 2024-03-25 NOTE — TELEPHONE ENCOUNTER
Spoke to spouse (Liliya ) letting her know I spoke to 's office as of now his on the waiting list .waiting for   to see if he can expedited the appointment

## 2024-03-25 NOTE — TELEPHONE ENCOUNTER
Patients GI provider:  Dr. Aviles    Number to return call: [phone]    Reason for call: Pt wife (Ana) calling for update on appt for pt with Dr. Serrano's office. Pt wife would like to know what is the next step and how long is the wait list for. Pt wife states you can leave a message on phone.    Scheduled procedure/appointment date if applicable: N/A

## 2024-08-12 DIAGNOSIS — K21.9 GASTROESOPHAGEAL REFLUX DISEASE WITHOUT ESOPHAGITIS: ICD-10-CM

## 2024-08-13 RX ORDER — PANTOPRAZOLE SODIUM 40 MG/1
40 TABLET, DELAYED RELEASE ORAL DAILY
Qty: 90 TABLET | Refills: 3 | Status: SHIPPED | OUTPATIENT
Start: 2024-08-13